# Patient Record
Sex: FEMALE
[De-identification: names, ages, dates, MRNs, and addresses within clinical notes are randomized per-mention and may not be internally consistent; named-entity substitution may affect disease eponyms.]

---

## 2018-06-25 ENCOUNTER — HOSPITAL ENCOUNTER (OUTPATIENT)
Dept: HOSPITAL 25 - OREAST | Age: 68
Discharge: HOME | End: 2018-06-25
Attending: OPHTHALMOLOGY
Payer: MEDICARE

## 2018-06-25 VITALS — SYSTOLIC BLOOD PRESSURE: 126 MMHG | DIASTOLIC BLOOD PRESSURE: 87 MMHG

## 2018-06-25 DIAGNOSIS — H25.811: Primary | ICD-10-CM

## 2018-06-25 DIAGNOSIS — I10: ICD-10-CM

## 2018-06-25 DIAGNOSIS — H43.813: ICD-10-CM

## 2018-06-25 PROCEDURE — V2632 POST CHMBR INTRAOCULAR LENS: HCPCS

## 2018-06-26 NOTE — OP
DATE OF OPERATION:  06/25/18 - [facility]

 

DATE OF BIRTH:  [birth year]

 

SURGEON:  Scooter Hall MD

 

ANESTHESIA:  Monitored anesthesia care.

 

PRE-OP DIAGNOSIS:  Cataract, right eye.

 

POST-OP DIAGNOSIS:  Cataract, right eye.

 

OPERATIVE PROCEDURE:  Extracapsular cataract extraction of the right eye with 
intraocular lens implant.

 

IMPLANTS:  SN60WF 11.0 diopter lens to the right eye.

 

COMPLICATIONS:  None.

 

DESCRIPTION OF PROCEDURE:  The patient was given phenylephrine 2.5% and 
cyclopentolate 1% eye drops to the operative eye in the preoperative area.  The 
patient was taken to the operating room where a time-out was taken to identify 
the correct patient, site, and side of the surgery.  The patient's right eye 
was prepped and draped in the usual sterile fashion with 5% Betadine.  A second 
time- out was taken to verify the correct patient, site and side of the surgery 
and correct lens implant.  A lid speculum was placed to the right eye.  A 1-mm 
paracentesis blade was used to make a clear corneal incision in the 
superotemporal position.  Preservative-free 1% lidocaine was injected into the 
anterior chamber. DisCoVisc was then injected in the anterior chamber. A 2.75-
mm keratome blade was used to make a triplanar incision at the inferotemporal 
position.  A cystotome initiated a capsulorrhexis, which was completed with 
Utrata forceps in a continuous and curvilinear manner.  Hydrodissection of the 
lens was performed with BSS on a cannula.  The lens could be spun in the 
capsular bag. The phacoemulsification handpiece was used with a divide-and-
conquer technique to remove the nucleus with 17.10 CDE.  The I/A handpiece then 
removed the residual cortical lens material. DisCoVisc was injected to inflate 
the capsular bag.  The planned SN60WF 11.0 diopter lens was injected into the 
capsular bag.  The residual DisCoVisc was removed from the eye with the I/A 
handpiece. The corneal incisions were hydrated and no leaks occurred at 
physiologic pressure around 20 mmHg per palpation.  The lid speculum was 
removed and drapes removed.  Maxitrol ointment was placed on the surface of the 
operative eye.  An adhesive patch and shield were then placed on the operative 
eye.  The patient was taken to the postoperative area in stable condition.

 

 [id number]/CPS #: [id number]

MTDD

## 2018-07-09 ENCOUNTER — HOSPITAL ENCOUNTER (OUTPATIENT)
Dept: HOSPITAL 25 - OREAST | Age: 68
Discharge: HOME | End: 2018-07-09
Attending: OPHTHALMOLOGY
Payer: MEDICARE

## 2018-07-09 VITALS — SYSTOLIC BLOOD PRESSURE: 129 MMHG | DIASTOLIC BLOOD PRESSURE: 84 MMHG

## 2018-07-09 DIAGNOSIS — H25.812: Primary | ICD-10-CM

## 2018-07-09 DIAGNOSIS — I10: ICD-10-CM

## 2018-07-09 DIAGNOSIS — H43.813: ICD-10-CM

## 2018-07-09 PROCEDURE — V2632 POST CHMBR INTRAOCULAR LENS: HCPCS

## 2018-07-10 NOTE — OP
DATE OF OPERATION:  07/09/18 - [facility]

 

DATE OF BIRTH:  [birth year]

 

SURGEON:  Scooter Hall MD

 

ANESTHESIA:  Monitored anesthesia care.

 

PRE-OP DIAGNOSIS:  Cataract, left eye.

 

POST-OP DIAGNOSIS:  Cataract, left eye.

 

OPERATIVE PROCEDURE:  Extracapsular cataract extraction of the left eye with 
intraocular lens implant.

 

IMPLANTS:  SN60WF 12.5 diopter lens to the left eye.

 

COMPLICATIONS:  None.

 

DESCRIPTION OF PROCEDURE:  The patient was given phenylephrine 2.5% and 
cyclopentolate 1% eye drops to the operative eye in the preoperative area.  The 
patient was taken to the operating room where a time-out was taken to identify 
the correct patient, site, and side of surgery.  The patient's left eye was 
prepped and draped in the usual sterile fashion with 5% Betadine.  A second time
-out was taken to verify the correct patient, site, and side of surgery, and 
correct lens implant. A lid speculum was placed to the left eye.  A 1-mm 
paracentesis blade was used to make a clear corneal incision in the 
inferotemporal position.  Preservative-free 1% lidocaine was injected into the 
anterior chamber.  DisCoVisc was then injected into the anterior chamber. A 
2.75 mm keratome blade was used to make a triplanar incision at the 
superotemporal position.  A cystotome initiated the capsulorrhexis, which was 
completed with Utrata forceps in a continuous and curvilinear manner. 
Hydrodissection of the lens was performed with BSS on a cannula.  The lens 
could be spun in a capsular bag. The phacoemulsification handpiece was used 
with a divide and conquer technique to remove the nucleus with 22.56 CDE.  The I
/A handpiece was then removed with a residual cortical lens material.  
DisCoVisc was injected to inflate the capsular bag.  The planned SN60WF 12.5 
diopter lens was injected into the capsular bag.  The residual DisCoVisc was 
removed from the eye with a I/A handpiece.  The corneal incisions were hydrated 
and no leaks occurred at physiologic pressure around 20 mmHg per palpation.  
The lid speculum was removed and drapes removed.  Maxitrol ointment was placed 
to the surface of the operative eye.  An adhesive patch and shield was then 
placed on the operative eye.  The patient was taken to the postoperative area 
in stable condition.

 

 [id number]/CPS #: [id number]

MTDD